# Patient Record
Sex: MALE | Race: WHITE | NOT HISPANIC OR LATINO
[De-identification: names, ages, dates, MRNs, and addresses within clinical notes are randomized per-mention and may not be internally consistent; named-entity substitution may affect disease eponyms.]

---

## 2019-06-05 ENCOUNTER — APPOINTMENT (OUTPATIENT)
Dept: PSYCHIATRY | Facility: CLINIC | Age: 69
End: 2019-06-05
Payer: COMMERCIAL

## 2019-06-05 DIAGNOSIS — M10.9 GOUT, UNSPECIFIED: ICD-10-CM

## 2019-06-05 PROCEDURE — 99205 OFFICE O/P NEW HI 60 MIN: CPT

## 2019-08-14 ENCOUNTER — APPOINTMENT (OUTPATIENT)
Dept: PSYCHIATRY | Facility: CLINIC | Age: 69
End: 2019-08-14
Payer: COMMERCIAL

## 2019-08-14 DIAGNOSIS — F12.90 CANNABIS USE, UNSPECIFIED, UNCOMPLICATED: ICD-10-CM

## 2019-08-14 DIAGNOSIS — M19.90 UNSPECIFIED OSTEOARTHRITIS, UNSPECIFIED SITE: ICD-10-CM

## 2019-08-14 PROCEDURE — 99214 OFFICE O/P EST MOD 30 MIN: CPT

## 2019-08-30 ENCOUNTER — APPOINTMENT (OUTPATIENT)
Dept: PSYCHIATRY | Facility: CLINIC | Age: 69
End: 2019-08-30
Payer: COMMERCIAL

## 2019-08-30 PROCEDURE — 90791 PSYCH DIAGNOSTIC EVALUATION: CPT

## 2019-09-24 ENCOUNTER — APPOINTMENT (OUTPATIENT)
Dept: PSYCHIATRY | Facility: CLINIC | Age: 69
End: 2019-09-24
Payer: COMMERCIAL

## 2019-09-24 PROCEDURE — 90837 PSYTX W PT 60 MINUTES: CPT

## 2019-10-23 ENCOUNTER — APPOINTMENT (OUTPATIENT)
Dept: PSYCHIATRY | Facility: CLINIC | Age: 69
End: 2019-10-23
Payer: COMMERCIAL

## 2019-10-23 PROCEDURE — 90837 PSYTX W PT 60 MINUTES: CPT

## 2019-12-04 ENCOUNTER — APPOINTMENT (OUTPATIENT)
Dept: PSYCHIATRY | Facility: CLINIC | Age: 69
End: 2019-12-04
Payer: COMMERCIAL

## 2019-12-04 PROCEDURE — 90837 PSYTX W PT 60 MINUTES: CPT

## 2020-02-05 ENCOUNTER — APPOINTMENT (OUTPATIENT)
Dept: PSYCHIATRY | Facility: CLINIC | Age: 70
End: 2020-02-05
Payer: COMMERCIAL

## 2020-02-05 DIAGNOSIS — F43.22 ADJUSTMENT DISORDER WITH ANXIETY: ICD-10-CM

## 2020-02-05 DIAGNOSIS — F32.9 MAJOR DEPRESSIVE DISORDER, SINGLE EPISODE, UNSPECIFIED: ICD-10-CM

## 2020-02-05 PROCEDURE — 90837 PSYTX W PT 60 MINUTES: CPT

## 2020-02-06 PROBLEM — F32.9 DEPRESSION: Status: ACTIVE | Noted: 2019-09-03

## 2020-02-06 PROBLEM — F43.22 ADJUSTMENT DISORDER WITH ANXIETY: Status: ACTIVE | Noted: 2019-06-05

## 2020-04-01 ENCOUNTER — APPOINTMENT (OUTPATIENT)
Dept: PSYCHIATRY | Facility: CLINIC | Age: 70
End: 2020-04-01

## 2021-12-29 ENCOUNTER — APPOINTMENT (OUTPATIENT)
Dept: ULTRASOUND IMAGING | Facility: HOSPITAL | Age: 71
End: 2021-12-29
Payer: COMMERCIAL

## 2021-12-29 ENCOUNTER — OUTPATIENT (OUTPATIENT)
Dept: OUTPATIENT SERVICES | Facility: HOSPITAL | Age: 71
LOS: 1 days | End: 2021-12-29
Payer: COMMERCIAL

## 2021-12-29 DIAGNOSIS — Z00.8 ENCOUNTER FOR OTHER GENERAL EXAMINATION: ICD-10-CM

## 2021-12-29 PROCEDURE — 76857 US EXAM PELVIC LIMITED: CPT

## 2021-12-29 PROCEDURE — 76857 US EXAM PELVIC LIMITED: CPT | Mod: 26

## 2022-10-25 ENCOUNTER — EMERGENCY (EMERGENCY)
Facility: HOSPITAL | Age: 72
LOS: 1 days | Discharge: HOME | End: 2022-10-25
Attending: INTERNAL MEDICINE | Admitting: INTERNAL MEDICINE
Payer: COMMERCIAL

## 2022-10-25 VITALS
DIASTOLIC BLOOD PRESSURE: 85 MMHG | RESPIRATION RATE: 14 BRPM | HEIGHT: 76 IN | TEMPERATURE: 97 F | SYSTOLIC BLOOD PRESSURE: 136 MMHG | WEIGHT: 225.09 LBS | OXYGEN SATURATION: 96 % | HEART RATE: 82 BPM

## 2022-10-25 PROCEDURE — 99284 EMERGENCY DEPT VISIT MOD MDM: CPT | Mod: 25

## 2022-10-25 PROCEDURE — 72192 CT PELVIS W/O DYE: CPT | Mod: MA

## 2022-10-25 PROCEDURE — 76376 3D RENDER W/INTRP POSTPROCES: CPT

## 2022-10-25 PROCEDURE — 72192 CT PELVIS W/O DYE: CPT | Mod: 26,MA

## 2022-10-25 PROCEDURE — 76376 3D RENDER W/INTRP POSTPROCES: CPT | Mod: 26

## 2022-10-25 PROCEDURE — 99284 EMERGENCY DEPT VISIT MOD MDM: CPT

## 2022-10-25 RX ORDER — OXYCODONE AND ACETAMINOPHEN 5; 325 MG/1; MG/1
1 TABLET ORAL
Qty: 12 | Refills: 0
Start: 2022-10-25 | End: 2022-10-27

## 2022-10-25 RX ORDER — OXYCODONE AND ACETAMINOPHEN 5; 325 MG/1; MG/1
1 TABLET ORAL ONCE
Refills: 0 | Status: DISCONTINUED | OUTPATIENT
Start: 2022-10-25 | End: 2022-10-25

## 2022-10-25 RX ADMIN — OXYCODONE AND ACETAMINOPHEN 1 TABLET(S): 5; 325 TABLET ORAL at 16:35

## 2022-10-25 NOTE — ED PROVIDER NOTE - CARE PLAN
Principal Discharge DX:	Contusion   1 Principal Discharge DX:	Contusion of buttock  Secondary Diagnosis:	Accidental fall

## 2022-10-25 NOTE — ED PROVIDER NOTE - PHYSICAL EXAMINATION
Gen: Well appearing in NAD  Head: NC/AT  Neck: trachea midline  Resp:  No distress  Ext: no deformities, FROM, tenderness left buttocks  Spine: no midline tenderness  Neuro:  A&O appears non focal, normal gait  Skin:  Warm and dry as visualized, no ecchymosis  Psych:  Normal affect and mood

## 2022-10-25 NOTE — ED PROVIDER NOTE - CLINICAL SUMMARY MEDICAL DECISION MAKING FREE TEXT BOX
pt 72 yo m c/o 4foot fall onto his buttocks 3 days ago. +pain. took advil wo relief. denies numbness, tingling, weakness. saw pcp today who sent pt to ed for a ct. requesting stronger pain medications pt 70 yo m c/o 4foot fall onto his buttocks 3 days ago. +pain. took advil wo relief. denies numbness, tingling, weakness, hematuria. saw pcp today who sent pt to ed for a ct. requesting stronger pain medications  CT no acute fx  no right sided abd/flank pain or tenderness to correlate with findings on ct  Discussed with patient need to return to ED if symptoms don't continue to improve or recur or develops any new or worsening symptoms that are of concern.

## 2022-10-25 NOTE — ED PROVIDER NOTE - NSFOLLOWUPINSTRUCTIONS_ED_ALL_ED_FT
Please follow-up with your doctor(s) within the next 3 days, but see medical sooner if your symptoms persist or worsen.  Please call tomorrow for an appointment.    You were given a copy of your labs and/or imaging.  Please go-over these with your doctor(s).     If you have any worsening of symptoms or any other concerns please see your doctor or return to the ED immediately.    Please continue taking your home medications as directed.  Do not use alcohol when taking any medication (especially antibiotics, tylenol or other pain medication) unless you check with the doctor or pharmacist.     Take the pain medication sent by your primary care provider as directed

## 2022-10-25 NOTE — ED PROVIDER NOTE - ATTENDING APP SHARED VISIT CONTRIBUTION OF CARE
pt 70 yo m c/o 4foot fall onto his buttocks 3 days ago. +pain. took advil wo relief. denies numbness, tingling, weakness, hematuria. saw pcp today who sent pt to ed for a ct. requesting stronger pain medications  CT no acute fx  no right sided abd/flank pain or tenderness to correlate with findings on ct  Discussed with patient need to return to ED if symptoms don't continue to improve or recur or develops any new or worsening symptoms that are of concern.  Dr. Soria:  I have reviewed and discussed with the PA/ resident the case specifics, including the history, physical assessment, evaluation, conclusion, laboratory results, and medical plan. I agree with the contents, and conclusions. I have personally examined, and interviewed the patient.

## 2022-10-25 NOTE — ED PROVIDER NOTE - PATIENT PORTAL LINK FT
You can access the FollowMyHealth Patient Portal offered by [facility] by registering at the following website: [email or web address]. By joining [employer]’s FollowMyHealth portal, you will also be able to view your health information using other applications (apps) compatible with our system.

## 2022-10-25 NOTE — ED ADULT TRIAGE NOTE - CHIEF COMPLAINT QUOTE
c/o lower back and pelvic pain s/p fall from 4 feet on a ladder 3 days go, sent in by PCP maged for CT scan. Denies any head trauma or LOC on fall. Take a baby aspirin daily.

## 2022-10-25 NOTE — ED ADULT NURSE NOTE - OBJECTIVE STATEMENT
Patient came from home after a fall from a ladder about three days ago with complaint of pelvic and lower back pain. Patient sent in by PCP for CT scan. Denies any LOC and hitting his head. Patient takes baby aspirin daily. Denies any nausea, vomit, dizziness or blurry vision.

## 2022-10-25 NOTE — ED PROVIDER NOTE - OBJECTIVE STATEMENT
pt 72 yo m c/o 4foot fall onto his buttocks 3 days ago. +pain. took advil wo relief. denies numbness, tingling, weakness. saw pcp today who sent pt to ed for a ct. requesting stronger pain medications

## 2022-10-26 RX ORDER — LIDOCAINE 4 G/100G
1 CREAM TOPICAL
Qty: 20 | Refills: 0
Start: 2022-10-26 | End: 2022-11-04

## 2022-10-26 RX ORDER — OXYCODONE AND ACETAMINOPHEN 5; 325 MG/1; MG/1
1 TABLET ORAL
Qty: 12 | Refills: 0
Start: 2022-10-26 | End: 2022-10-28

## 2022-10-30 NOTE — CHART NOTE - NSCHARTNOTEFT_GEN_A_CORE
SW called pt to discuss and assist with follow up care.  Pt is a 72 y/o male presented to ED for fall.  Wife reports that pt is feeling one day better and the next day worse, currently undergoing PT and pt had appt with primary Dr. Kelsi Solomon on 10/27/22, on pain medication and muscle relaxant.  Pt did not raise any other issues that needs immediate attention.

## 2022-11-28 PROBLEM — Z78.9 OTHER SPECIFIED HEALTH STATUS: Chronic | Status: ACTIVE | Noted: 2022-10-25

## 2022-12-05 ENCOUNTER — APPOINTMENT (OUTPATIENT)
Dept: MRI IMAGING | Facility: HOSPITAL | Age: 72
End: 2022-12-05

## 2023-11-29 ENCOUNTER — APPOINTMENT (OUTPATIENT)
Dept: SPINE | Facility: CLINIC | Age: 73
End: 2023-11-29
Payer: COMMERCIAL

## 2023-11-29 VITALS
HEART RATE: 67 BPM | DIASTOLIC BLOOD PRESSURE: 87 MMHG | BODY MASS INDEX: 26.18 KG/M2 | WEIGHT: 215 LBS | HEIGHT: 76 IN | SYSTOLIC BLOOD PRESSURE: 147 MMHG | OXYGEN SATURATION: 95 %

## 2023-11-29 DIAGNOSIS — Z78.9 OTHER SPECIFIED HEALTH STATUS: ICD-10-CM

## 2023-11-29 DIAGNOSIS — Z82.49 FAMILY HISTORY OF ISCHEMIC HEART DISEASE AND OTHER DISEASES OF THE CIRCULATORY SYSTEM: ICD-10-CM

## 2023-11-29 PROCEDURE — 99204 OFFICE O/P NEW MOD 45 MIN: CPT

## 2023-11-29 RX ORDER — ALLOPURINOL 100 MG/1
100 TABLET ORAL
Refills: 0 | Status: DISCONTINUED | COMMUNITY
End: 2023-11-29

## 2023-11-29 RX ORDER — ALLOPURINOL 100 MG/1
100 TABLET ORAL
Qty: 180 | Refills: 0 | Status: DISCONTINUED | COMMUNITY
Start: 2018-10-04 | End: 2023-11-29

## 2023-11-29 RX ORDER — GABAPENTIN 300 MG/1
300 CAPSULE ORAL
Qty: 90 | Refills: 1 | Status: DISCONTINUED | COMMUNITY
Start: 2019-06-05 | End: 2023-11-29

## 2023-12-06 ENCOUNTER — OUTPATIENT (OUTPATIENT)
Dept: OUTPATIENT SERVICES | Facility: HOSPITAL | Age: 73
LOS: 1 days | End: 2023-12-06
Payer: COMMERCIAL

## 2023-12-06 ENCOUNTER — APPOINTMENT (OUTPATIENT)
Dept: MRI IMAGING | Facility: HOSPITAL | Age: 73
End: 2023-12-06
Payer: COMMERCIAL

## 2023-12-06 DIAGNOSIS — M48.062 SPINAL STENOSIS, LUMBAR REGION WITH NEUROGENIC CLAUDICATION: ICD-10-CM

## 2023-12-06 PROCEDURE — 72148 MRI LUMBAR SPINE W/O DYE: CPT

## 2023-12-06 PROCEDURE — 72148 MRI LUMBAR SPINE W/O DYE: CPT | Mod: 26

## 2023-12-13 ENCOUNTER — APPOINTMENT (OUTPATIENT)
Dept: SPINE | Facility: CLINIC | Age: 73
End: 2023-12-13
Payer: COMMERCIAL

## 2023-12-13 VITALS
SYSTOLIC BLOOD PRESSURE: 144 MMHG | HEART RATE: 83 BPM | BODY MASS INDEX: 26.18 KG/M2 | DIASTOLIC BLOOD PRESSURE: 83 MMHG | WEIGHT: 215 LBS | HEIGHT: 76 IN | OXYGEN SATURATION: 96 %

## 2023-12-13 DIAGNOSIS — M48.062 SPINAL STENOSIS, LUMBAR REGION WITH NEUROGENIC CLAUDICATION: ICD-10-CM

## 2023-12-13 PROCEDURE — 99213 OFFICE O/P EST LOW 20 MIN: CPT

## 2023-12-13 RX ORDER — ACETAMINOPHEN 500 MG/1
500 TABLET, COATED ORAL
Refills: 0 | Status: ACTIVE | COMMUNITY

## 2023-12-13 RX ORDER — ALLOPURINOL 200 MG/1
200 TABLET ORAL
Refills: 0 | Status: ACTIVE | COMMUNITY

## 2023-12-13 RX ORDER — LIDOCAINE 5% 700 MG/1
5 PATCH TOPICAL
Refills: 0 | Status: ACTIVE | COMMUNITY

## 2024-02-21 ENCOUNTER — APPOINTMENT (OUTPATIENT)
Dept: SPINE | Facility: CLINIC | Age: 74
End: 2024-02-21

## 2024-03-12 ENCOUNTER — RX ONLY (RX ONLY)
Age: 74
End: 2024-03-12

## 2024-03-12 ENCOUNTER — OFFICE (OUTPATIENT)
Facility: LOCATION | Age: 74
Setting detail: OPHTHALMOLOGY
End: 2024-03-12
Payer: COMMERCIAL

## 2024-03-12 DIAGNOSIS — H25.13: ICD-10-CM

## 2024-03-12 DIAGNOSIS — H11.823: ICD-10-CM

## 2024-03-12 DIAGNOSIS — H02.89: ICD-10-CM

## 2024-03-12 DIAGNOSIS — H16.223: ICD-10-CM

## 2024-03-12 PROCEDURE — 83861 MICROFLUID ANALY TEARS: CPT | Performed by: OPHTHALMOLOGY

## 2024-03-12 PROCEDURE — 92004 COMPRE OPH EXAM NEW PT 1/>: CPT | Performed by: OPHTHALMOLOGY

## 2024-03-12 ASSESSMENT — REFRACTION_CURRENTRX
OS_SPHERE: +3.25
OS_AXIS: 095
OS_AXIS: 105
OS_SPHERE: +5.25
OD_SPHERE: +2.75
OD_SPHERE: +4.50
OD_OVR_VA: 20/
OS_CYLINDER: -1.00
OS_OVR_VA: 20/
OD_CYLINDER: -1.00
OD_AXIS: 100
OS_CYLINDER: -0.75
OS_OVR_VA: 20/
OD_CYLINDER: -0.50
OD_AXIS: 089
OD_OVR_VA: 20/

## 2024-03-12 ASSESSMENT — LID EXAM ASSESSMENTS
OD_MEIBOMITIS: 2+
OS_MEIBOMITIS: 2+

## 2024-04-16 ENCOUNTER — NON-APPOINTMENT (OUTPATIENT)
Age: 74
End: 2024-04-16